# Patient Record
Sex: FEMALE | Race: BLACK OR AFRICAN AMERICAN | URBAN - METROPOLITAN AREA
[De-identification: names, ages, dates, MRNs, and addresses within clinical notes are randomized per-mention and may not be internally consistent; named-entity substitution may affect disease eponyms.]

---

## 2022-11-09 VITALS — HEIGHT: 64 IN

## 2022-11-09 PROBLEM — D57.3 SICKLE CELL TRAIT (HCC): Status: ACTIVE | Noted: 2022-11-09

## 2022-11-09 PROBLEM — D57.1 SICKLE CELL ANEMIA (HCC): Status: RESOLVED | Noted: 2022-11-09 | Resolved: 2022-11-09

## 2022-11-09 PROBLEM — D57.1 SICKLE CELL ANEMIA (HCC): Status: ACTIVE | Noted: 2022-11-09

## 2022-11-09 PROBLEM — Z86.59 HISTORY OF ANXIETY: Status: ACTIVE | Noted: 2022-11-09

## 2022-11-16 ENCOUNTER — OFFICE VISIT (OUTPATIENT)
Dept: OBGYN CLINIC | Age: 42
End: 2022-11-16
Payer: COMMERCIAL

## 2022-11-16 VITALS
HEIGHT: 64 IN | SYSTOLIC BLOOD PRESSURE: 108 MMHG | DIASTOLIC BLOOD PRESSURE: 66 MMHG | WEIGHT: 144.5 LBS | BODY MASS INDEX: 24.67 KG/M2

## 2022-11-16 DIAGNOSIS — Z12.4 SCREENING FOR MALIGNANT NEOPLASM OF CERVIX: Primary | ICD-10-CM

## 2022-11-16 DIAGNOSIS — Z01.419 ROUTINE GYNECOLOGICAL EXAMINATION: ICD-10-CM

## 2022-11-16 DIAGNOSIS — Z11.3 SCREENING EXAMINATION FOR VENEREAL DISEASE: ICD-10-CM

## 2022-11-16 PROCEDURE — 99386 PREV VISIT NEW AGE 40-64: CPT | Performed by: OBSTETRICS & GYNECOLOGY

## 2022-11-16 NOTE — PROGRESS NOTES
Dennis Gilliam is a G3 [id number], 40 y.o. female   Patient's last menstrual period was 10/28/2022 (exact date). She presents for her annual    She is having no significant problems. Menstrual status:  Cycles are usually regular with a 26-32 day interval with 3-7 day duration. Flow: moderate. She does not have dysmenorrhea. Medical conditions:  Since her last annual GYN exam about one year ago, she has not the following changes in her health history: none. Mammogram History:    TIFFANY Results (most recent):  No results found for this or any previous visit. DEXA Results (most recent):  No results found for this or any previous visit. Past Medical History:   Diagnosis Date    History of anxiety 11/9/2022    Sickle cell anemia ([facility] [address] 11/9/2022    Sickle cell trait ([facility] 11/9/2022     Past Surgical History:   Procedure Laterality Date    [place] CYSTOSCOPY,INSERT URETERAL STENT         Prior to Admission medications    Medication Sig Start Date End Date Taking? Authorizing Provider   MULTIVITAMIN PO Take  by mouth. Yes Provider, Historical       No Known Allergies       Tobacco History:  reports that she has never smoked. She has never used smokeless tobacco.  Alcohol use:  reports that she does not currently use alcohol. Drug use:  reports that she does not currently use drugs. Family Medical/Cancer History:   Family History   Problem Relation Age of Onset    Sickle Cell Trait Mother     Heart Failure Mother         drug use    Asthma Mother     Sickle Cell Trait Father     Sickle Cell Trait Sister     Anxiety Sister     Diabetes Paternal Aunt     Hypertension Maternal Grandmother     Hypertension Maternal Grandfather     Dementia Maternal Grandfather     Hypertension Paternal Grandmother     Diabetes Paternal Grandmother     Hypotension Paternal Grandfather           Review of Systems   Constitutional:  Negative for chills, fever, malaise/fatigue and weight loss.    HENT:  Negative for congestion, ear pain, sinus pain and tinnitus. Eyes:  Negative for blurred vision and double vision. Respiratory:  Negative for cough, shortness of breath and wheezing. Cardiovascular:  Negative for chest pain and palpitations. Gastrointestinal:  Negative for abdominal pain, blood in stool, constipation, diarrhea, heartburn, nausea and vomiting. Genitourinary:  Negative for dysuria, flank pain, frequency, hematuria and urgency. Musculoskeletal:  Negative for joint pain and myalgias. Skin:  Negative for itching and rash. Neurological:  Negative for dizziness, weakness and headaches. Psychiatric/Behavioral:  Negative for depression, memory loss and suicidal ideas. The patient is not nervous/anxious and does not have insomnia. Physical Exam  Constitutional:       Appearance: Normal appearance. HENT:      Head: Normocephalic and atraumatic. Cardiovascular:      Rate and Rhythm: Normal rate. Heart sounds: Normal heart sounds. Pulmonary:      Effort: Pulmonary effort is normal.      Breath sounds: Normal breath sounds. Chest:   Breasts:     Right: Normal.      Left: Normal.   Abdominal:      General: Abdomen is flat. Palpations: Abdomen is soft. Genitourinary:     General: Normal vulva. Vagina: Normal.      Cervix: Normal.      Uterus: Normal.       Adnexa: Right adnexa normal and left adnexa normal.      Rectum: Normal.      Comments: PAP Obtained  Neurological:      Mental Status: She is alert. Psychiatric:         Mood and Affect: Mood normal.         Behavior: Behavior normal.         Thought Content: Thought content normal.        Visit Vitals  /66 (BP 1 Location: Left upper arm, BP Patient Position: Sitting, BP Cuff Size: Small adult)   Ht 5' 4\" (1.626 m)   Wt 144 lb 8 oz (65.5 kg)   LMP 10/28/2022 (Exact Date)   BMI 24.80 kg/m²         Assessment: Diagnoses and all orders for this visit:    1.  Screening for malignant neoplasm of cervix  -     PAP IG, CT-NG, RFX APTIMA HPV ASCUS ([id number], [id number])    2. Routine gynecological examination  -     PAP IG, CT-NG, RFX APTIMA HPV ASCUS ([id number], [id number])    3. Screening examination for venereal disease  -     PAP IG, CT-NG, RFX APTIMA HPV ASCUS ([id number], [id number])      Plan: Questions addressed  Counseled re: diet, exercise, healthy lifestyle  Return for Annual  Rec annual mammogram      Follow-up and Dispositions    Return for 1 yr annual, 1 yr mammo.

## 2022-11-16 NOTE — PROGRESS NOTES
Chief Complaint   Patient presents with    New Patient     Annual exam     Visit Vitals  /66 (BP 1 Location: Left upper arm, BP Patient Position: Sitting, BP Cuff Size: Small adult)   Ht 5' 4\" (1.626 m)   Wt 144 lb 8 oz (65.5 kg)   LMP 10/28/2022 (Exact Date)   BMI 24.80 kg/m²

## 2022-11-18 LAB
C TRACH RRNA CVX QL NAA+PROBE: NEGATIVE
CYTOLOGIST CVX/VAG CYTO: NORMAL
CYTOLOGY CVX/VAG DOC CYTO: NORMAL
CYTOLOGY CVX/VAG DOC THIN PREP: NORMAL
DX ICD CODE: NORMAL
LABCORP, 190119: NORMAL
Lab: NORMAL
N GONORRHOEA RRNA CVX QL NAA+PROBE: NEGATIVE
OTHER STN SPEC: NORMAL
STAT OF ADQ CVX/VAG CYTO-IMP: NORMAL

## 2022-12-01 ENCOUNTER — TELEPHONE (OUTPATIENT)
Dept: OBGYN CLINIC | Age: 42
End: 2022-12-01